# Patient Record
(demographics unavailable — no encounter records)

---

## 2018-05-21 NOTE — OR
DATE:  05/21/2018

 

PROCEDURE PERFORMED:  Total colonoscopy.

 

INSTRUMENT USED:  PCF-H180AL Olympus videocolonoscope.

 

PREMEDICATIONS:  Fentanyl 100 mcg intravenous and Versed 4 mg intravenous.

Nasal O2 cannula.

 

The procedure was done under pulse oximetry, BP recording, and cardiac monitor.

 

INDICATION:  Screening colonoscopic examination is done for detection of any

polypoid lesions and removal, endoscopic hemostasis therapy if needed.

 

DESCRIPTION OF PROCEDURE:  Initial rectal exam was unremarkable.  Rigid anoscopy

was normal.  The colonoscope was passed with ease up to the ileocecal area,

photographs were taken of the normal-appearing cecum identified by double-bulged

ileocecal folds.  No bleeding was noted from any of the visualized areas at the

commencement of the examination.  No stricture.  No vascular ectasia.  No large

isolated ulcerations seen.  No evidence of diffuse inflammatory bowel disease in

the form of friability, contact bleeding, or ulcerations.  No polyp or tumor

mass identified.  The bowel preparation was less than adequate requiring

aspiration of quite a bit of liquid fecal material.  Probing the proximal sides

of folds and flexures and clearing of the stool material, withdrawal of the

scope was made, cecum to rectum time over 6 minutes.  No bleeding was noted from

any of the visualized areas at the completion of the examination.

 

IMPRESSION:  Normal study.

 

The patient tolerated the procedure well.

 

DD:  05/21/2018 08:43:35

DT:  05/21/2018 09:02:13

[facility]

Job #:  [id number]